# Patient Record
Sex: MALE | Race: BLACK OR AFRICAN AMERICAN | NOT HISPANIC OR LATINO | Employment: OTHER | URBAN - METROPOLITAN AREA
[De-identification: names, ages, dates, MRNs, and addresses within clinical notes are randomized per-mention and may not be internally consistent; named-entity substitution may affect disease eponyms.]

---

## 2021-10-13 DIAGNOSIS — U07.1 COVID-19 VIRUS DETECTED: ICD-10-CM

## 2021-10-15 ENCOUNTER — PATIENT OUTREACH (OUTPATIENT)
Dept: INFECTIOUS DISEASES | Facility: HOSPITAL | Age: 71
End: 2021-10-15

## 2021-10-15 ENCOUNTER — NURSE TRIAGE (OUTPATIENT)
Dept: ADMINISTRATIVE | Facility: CLINIC | Age: 71
End: 2021-10-15

## 2021-10-18 ENCOUNTER — TELEPHONE (OUTPATIENT)
Dept: ADMINISTRATIVE | Facility: OTHER | Age: 71
End: 2021-10-18

## 2022-05-09 ENCOUNTER — TELEPHONE (OUTPATIENT)
Dept: ADMINISTRATIVE | Facility: OTHER | Age: 72
End: 2022-05-09
Payer: MEDICARE

## 2022-05-11 ENCOUNTER — TELEPHONE (OUTPATIENT)
Dept: DERMATOLOGY | Facility: CLINIC | Age: 72
End: 2022-05-11
Payer: MEDICARE

## 2022-05-11 NOTE — TELEPHONE ENCOUNTER
Apt made and confirm    ----- Message from Yuki De Leon sent at 5/11/2022  1:48 PM CDT -----  Good afternoon,    The patient have an urgent referral from the emergency department with a diagnosis of Dermatitis. He want to know if he can get an appointment sometime next week, because he cannot come this week. Please assist with scheduling the patient?    Thank you

## 2022-08-10 ENCOUNTER — HOSPITAL ENCOUNTER (EMERGENCY)
Facility: HOSPITAL | Age: 72
Discharge: HOME | End: 2022-08-10
Attending: EMERGENCY MEDICINE
Payer: COMMERCIAL

## 2022-08-10 VITALS
HEIGHT: 69 IN | HEART RATE: 77 BPM | RESPIRATION RATE: 20 BRPM | SYSTOLIC BLOOD PRESSURE: 175 MMHG | BODY MASS INDEX: 31.1 KG/M2 | TEMPERATURE: 98 F | WEIGHT: 210 LBS | OXYGEN SATURATION: 98 % | DIASTOLIC BLOOD PRESSURE: 89 MMHG

## 2022-08-10 DIAGNOSIS — M25.461 BILATERAL KNEE SWELLING: ICD-10-CM

## 2022-08-10 DIAGNOSIS — R53.1 WEAKNESS: ICD-10-CM

## 2022-08-10 DIAGNOSIS — M25.462 BILATERAL KNEE SWELLING: ICD-10-CM

## 2022-08-10 DIAGNOSIS — M10.9 ACUTE GOUT OF RIGHT KNEE, UNSPECIFIED CAUSE: Primary | ICD-10-CM

## 2022-08-10 LAB
ALBUMIN SERPL BCP-MCNC: 3.7 G/DL (ref 3.5–5.2)
ALP SERPL-CCNC: 69 U/L (ref 55–135)
ALT SERPL W/O P-5'-P-CCNC: 14 U/L (ref 10–44)
ANION GAP SERPL CALC-SCNC: 10 MMOL/L (ref 8–16)
APPEARANCE FLD: NORMAL
AST SERPL-CCNC: 14 U/L (ref 10–40)
BACTERIA #/AREA URNS AUTO: ABNORMAL /HPF
BASOPHILS # BLD AUTO: 0.03 K/UL (ref 0–0.2)
BASOPHILS NFR BLD: 0.3 % (ref 0–1.9)
BILIRUB SERPL-MCNC: 2 MG/DL (ref 0.1–1)
BILIRUB UR QL STRIP: NEGATIVE
BODY FLD TYPE: ABNORMAL
BODY FLD TYPE: NORMAL
BUN SERPL-MCNC: 26 MG/DL (ref 8–23)
CALCIUM SERPL-MCNC: 11.2 MG/DL (ref 8.7–10.5)
CHLORIDE SERPL-SCNC: 100 MMOL/L (ref 95–110)
CLARITY UR REFRACT.AUTO: CLEAR
CO2 SERPL-SCNC: 26 MMOL/L (ref 23–29)
COLOR FLD: YELLOW
COLOR UR AUTO: YELLOW
CREAT SERPL-MCNC: 2 MG/DL (ref 0.5–1.4)
CRP SERPL-MCNC: 222.9 MG/L (ref 0–8.2)
CRYSTALS FLD MICRO: POSITIVE
DIFFERENTIAL METHOD: ABNORMAL
EOSINOPHIL # BLD AUTO: 0.2 K/UL (ref 0–0.5)
EOSINOPHIL NFR BLD: 1.9 % (ref 0–8)
ERYTHROCYTE [DISTWIDTH] IN BLOOD BY AUTOMATED COUNT: 13.8 % (ref 11.5–14.5)
ERYTHROCYTE [SEDIMENTATION RATE] IN BLOOD BY PHOTOMETRIC METHOD: 66 MM/HR (ref 0–23)
EST. GFR  (NO RACE VARIABLE): 34.8 ML/MIN/1.73 M^2
GLUCOSE SERPL-MCNC: 146 MG/DL (ref 70–110)
GLUCOSE UR QL STRIP: NEGATIVE
GRAM STN SPEC: NORMAL
GRAM STN SPEC: NORMAL
HCT VFR BLD AUTO: 43.2 % (ref 40–54)
HGB BLD-MCNC: 15.3 G/DL (ref 14–18)
HGB UR QL STRIP: NEGATIVE
HYALINE CASTS UR QL AUTO: 9 /LPF
IMM GRANULOCYTES # BLD AUTO: 0.03 K/UL (ref 0–0.04)
IMM GRANULOCYTES NFR BLD AUTO: 0.3 % (ref 0–0.5)
KETONES UR QL STRIP: NEGATIVE
LACTATE SERPL-SCNC: 2.1 MMOL/L (ref 0.5–2.2)
LACTATE SERPL-SCNC: 2.9 MMOL/L (ref 0.5–2.2)
LEUKOCYTE ESTERASE UR QL STRIP: NEGATIVE
LYMPHOCYTES # BLD AUTO: 1.1 K/UL (ref 1–4.8)
LYMPHOCYTES NFR BLD: 12.3 % (ref 18–48)
LYMPHOCYTES NFR FLD MANUAL: 4 %
MCH RBC QN AUTO: 34.1 PG (ref 27–31)
MCHC RBC AUTO-ENTMCNC: 35.4 G/DL (ref 32–36)
MCV RBC AUTO: 96 FL (ref 82–98)
MICROSCOPIC COMMENT: ABNORMAL
MONOCYTES # BLD AUTO: 0.9 K/UL (ref 0.3–1)
MONOCYTES NFR BLD: 10 % (ref 4–15)
MONOS+MACROS NFR FLD MANUAL: 2 %
NEUTROPHILS # BLD AUTO: 6.8 K/UL (ref 1.8–7.7)
NEUTROPHILS NFR BLD: 75.2 % (ref 38–73)
NEUTROPHILS NFR FLD MANUAL: 94 %
NITRITE UR QL STRIP: NEGATIVE
NRBC BLD-RTO: 0 /100 WBC
PH UR STRIP: 6 [PH] (ref 5–8)
PLATELET # BLD AUTO: 193 K/UL (ref 150–450)
PMV BLD AUTO: 11 FL (ref 9.2–12.9)
POTASSIUM SERPL-SCNC: 4.9 MMOL/L (ref 3.5–5.1)
PROT SERPL-MCNC: 8.6 G/DL (ref 6–8.4)
PROT UR QL STRIP: ABNORMAL
RBC # BLD AUTO: 4.49 M/UL (ref 4.6–6.2)
RBC #/AREA URNS AUTO: 1 /HPF (ref 0–4)
SODIUM SERPL-SCNC: 136 MMOL/L (ref 136–145)
SP GR UR STRIP: 1.01 (ref 1–1.03)
SQUAMOUS #/AREA URNS AUTO: 0 /HPF
URN SPEC COLLECT METH UR: ABNORMAL
WBC # BLD AUTO: 9.01 K/UL (ref 3.9–12.7)
WBC # FLD: NORMAL /CU MM
WBC #/AREA URNS AUTO: 1 /HPF (ref 0–5)

## 2022-08-10 PROCEDURE — 86803 HEPATITIS C AB TEST: CPT | Performed by: PHYSICIAN ASSISTANT

## 2022-08-10 PROCEDURE — 86140 C-REACTIVE PROTEIN: CPT | Performed by: PHYSICIAN ASSISTANT

## 2022-08-10 PROCEDURE — 20610 PR DRAIN/INJECT LARGE JOINT/BURSA: ICD-10-PCS | Mod: RT,,, | Performed by: EMERGENCY MEDICINE

## 2022-08-10 PROCEDURE — 80053 COMPREHEN METABOLIC PANEL: CPT | Performed by: PHYSICIAN ASSISTANT

## 2022-08-10 PROCEDURE — 85652 RBC SED RATE AUTOMATED: CPT | Performed by: PHYSICIAN ASSISTANT

## 2022-08-10 PROCEDURE — 99285 EMERGENCY DEPT VISIT HI MDM: CPT | Mod: 25

## 2022-08-10 PROCEDURE — 93010 EKG 12-LEAD: ICD-10-PCS | Mod: ,,, | Performed by: INTERNAL MEDICINE

## 2022-08-10 PROCEDURE — 81001 URINALYSIS AUTO W/SCOPE: CPT | Performed by: PHYSICIAN ASSISTANT

## 2022-08-10 PROCEDURE — 20610 DRAIN/INJ JOINT/BURSA W/O US: CPT | Mod: RT

## 2022-08-10 PROCEDURE — 87116 MYCOBACTERIA CULTURE: CPT | Performed by: EMERGENCY MEDICINE

## 2022-08-10 PROCEDURE — 96374 THER/PROPH/DIAG INJ IV PUSH: CPT

## 2022-08-10 PROCEDURE — 87206 SMEAR FLUORESCENT/ACID STAI: CPT | Performed by: EMERGENCY MEDICINE

## 2022-08-10 PROCEDURE — 93010 ELECTROCARDIOGRAM REPORT: CPT | Mod: ,,, | Performed by: INTERNAL MEDICINE

## 2022-08-10 PROCEDURE — 20610 DRAIN/INJ JOINT/BURSA W/O US: CPT | Mod: RT,,, | Performed by: EMERGENCY MEDICINE

## 2022-08-10 PROCEDURE — 87040 BLOOD CULTURE FOR BACTERIA: CPT | Performed by: PHYSICIAN ASSISTANT

## 2022-08-10 PROCEDURE — 85025 COMPLETE CBC W/AUTO DIFF WBC: CPT | Performed by: PHYSICIAN ASSISTANT

## 2022-08-10 PROCEDURE — 99284 EMERGENCY DEPT VISIT MOD MDM: CPT | Mod: 25,FS,, | Performed by: EMERGENCY MEDICINE

## 2022-08-10 PROCEDURE — 89051 BODY FLUID CELL COUNT: CPT | Performed by: EMERGENCY MEDICINE

## 2022-08-10 PROCEDURE — 99284 PR EMERGENCY DEPT VISIT,LEVEL IV: ICD-10-PCS | Mod: 25,FS,, | Performed by: EMERGENCY MEDICINE

## 2022-08-10 PROCEDURE — 83605 ASSAY OF LACTIC ACID: CPT | Performed by: PHYSICIAN ASSISTANT

## 2022-08-10 PROCEDURE — 87205 SMEAR GRAM STAIN: CPT | Performed by: EMERGENCY MEDICINE

## 2022-08-10 PROCEDURE — 96361 HYDRATE IV INFUSION ADD-ON: CPT | Mod: 59

## 2022-08-10 PROCEDURE — 89060 EXAM SYNOVIAL FLUID CRYSTALS: CPT | Performed by: EMERGENCY MEDICINE

## 2022-08-10 PROCEDURE — 25000003 PHARM REV CODE 250: Performed by: PHYSICIAN ASSISTANT

## 2022-08-10 PROCEDURE — 93005 ELECTROCARDIOGRAM TRACING: CPT

## 2022-08-10 PROCEDURE — 87070 CULTURE OTHR SPECIMN AEROBIC: CPT | Performed by: EMERGENCY MEDICINE

## 2022-08-10 PROCEDURE — 63600175 PHARM REV CODE 636 W HCPCS: Performed by: PHYSICIAN ASSISTANT

## 2022-08-10 RX ORDER — KETOROLAC TROMETHAMINE 30 MG/ML
10 INJECTION, SOLUTION INTRAMUSCULAR; INTRAVENOUS
Status: COMPLETED | OUTPATIENT
Start: 2022-08-10 | End: 2022-08-10

## 2022-08-10 RX ORDER — COLCHICINE 0.6 MG/1
0.6 TABLET ORAL DAILY
Qty: 5 TABLET | Refills: 0 | Status: SHIPPED | OUTPATIENT
Start: 2022-08-10

## 2022-08-10 RX ORDER — COLCHICINE 0.6 MG/1
1.2 TABLET, FILM COATED ORAL
Status: COMPLETED | OUTPATIENT
Start: 2022-08-10 | End: 2022-08-10

## 2022-08-10 RX ORDER — DICLOFENAC SODIUM 10 MG/G
2 GEL TOPICAL DAILY
Qty: 50 G | Refills: 0 | Status: SHIPPED | OUTPATIENT
Start: 2022-08-10

## 2022-08-10 RX ADMIN — SODIUM CHLORIDE, SODIUM LACTATE, POTASSIUM CHLORIDE, AND CALCIUM CHLORIDE 1000 ML: .6; .31; .03; .02 INJECTION, SOLUTION INTRAVENOUS at 06:08

## 2022-08-10 RX ADMIN — KETOROLAC TROMETHAMINE 10 MG: 30 INJECTION, SOLUTION INTRAMUSCULAR; INTRAVENOUS at 09:08

## 2022-08-10 RX ADMIN — COLCHICINE 1.2 MG: 0.6 TABLET, FILM COATED ORAL at 09:08

## 2022-08-10 RX ADMIN — SODIUM CHLORIDE, SODIUM LACTATE, POTASSIUM CHLORIDE, AND CALCIUM CHLORIDE 1000 ML: .6; .31; .03; .02 INJECTION, SOLUTION INTRAVENOUS at 03:08

## 2022-08-10 NOTE — ED PROVIDER NOTES
Encounter Date: 8/10/2022       History     Chief Complaint   Patient presents with    Multiple complaints     Both knees swollen, feet and hands cracking , unable to stand     71-year-old male with hypertension presents to the ED for evaluation of knee pain and swelling as well as weakness.  Patient noticed last night that he was not able to stand up due to weakness.  He also noticed that both of his knees were swollen.  He has pain in both of his knees.  Patient does not feel weak in his upper body.  Patient is febrile in the ED, but was unaware that he had a fever.  Pt also complaining of a rash to his hand and feet for the past 3 weeks. He has been following up with his primary doctor for this. He notes that his hands are starting to crack in certain places. Patient denies cough, nausea, vomiting, diarrhea, shortness of breath, chest pain, changes in urination or other acute complaints.        Review of patient's allergies indicates:  No Known Allergies  Past Medical History:   Diagnosis Date    Hypertension      Past Surgical History:   Procedure Laterality Date    COLONOSCOPY N/A 10/27/2016    Procedure: COLONOSCOPY;  Surgeon: CHAPO Field MD;  Location: [facility];  Service: Endoscopy;  Laterality: N/A;    MANDIBLE SURGERY       Family History   Problem Relation Age of Onset    Hypertension Mother      Social History     Tobacco Use    Smoking status: Heavy Tobacco Smoker     Packs/day: 0.50    Smokeless tobacco: Never Used   Substance Use Topics    Alcohol use: No    Drug use: No     Review of Systems   Constitutional: Negative for fever.   HENT: Negative for sore throat.    Respiratory: Negative for shortness of breath.    Cardiovascular: Negative for chest pain.   Gastrointestinal: Negative for nausea.   Genitourinary: Negative for dysuria.   Musculoskeletal: Positive for arthralgias and joint swelling. Negative for back pain.   Skin: Positive for rash.   Neurological: Negative for weakness.    Hematological: Does not bruise/bleed easily.       Physical Exam     Initial Vitals [08/10/22 1400]   BP Pulse Resp Temp SpO2   131/78 85 20 (!) 100.8 °F (38.2 °C) 97 %      MAP       --         Physical Exam    Nursing note and vitals reviewed.  Constitutional: He appears well-developed and well-nourished.  Non-toxic appearance. He does not appear ill. No distress.   HENT:   Head: Normocephalic and atraumatic.   Neck: Neck supple.   Normal range of motion.  Cardiovascular: Normal rate and regular rhythm. Exam reveals no gallop, no distant heart sounds and no friction rub.    No murmur heard.  Pulmonary/Chest: Effort normal and breath sounds normal. No accessory muscle usage. No tachypnea. No respiratory distress. He has no decreased breath sounds. He has no wheezes. He has no rhonchi. He has no rales.   Abdominal: He exhibits no distension.   Musculoskeletal:      Cervical back: Normal range of motion and neck supple.      Comments: Large effusion noted to the right knee with tenderness slight overlying erythema and painful range of motion.  There is also a moderate effusion with tenderness and slight overlying erythema to the left knee.  Also with painful range of motion.     Neurological: He is alert.   Skin: No rash noted.         ED Course   Procedures  Labs Reviewed   CBC W/ AUTO DIFFERENTIAL - Abnormal; Notable for the following components:       Result Value    RBC 4.49 (*)     MCH 34.1 (*)     Gran % 75.2 (*)     Lymph % 12.3 (*)     All other components within normal limits   COMPREHENSIVE METABOLIC PANEL - Abnormal; Notable for the following components:    Glucose 146 (*)     BUN 26 (*)     Creatinine 2.0 (*)     Calcium 11.2 (*)     Total Protein 8.6 (*)     Total Bilirubin 2.0 (*)     eGFR 34.8 (*)     All other components within normal limits   LACTIC ACID, PLASMA - Abnormal; Notable for the following components:    Lactate (Lactic Acid) 2.9 (*)     All other components within normal limits    URINALYSIS, REFLEX TO URINE CULTURE - Abnormal; Notable for the following components:    Protein, UA 1+ (*)     All other components within normal limits    Narrative:     Specimen Source->Urine   SEDIMENTATION RATE - Abnormal; Notable for the following components:    Sed Rate 66 (*)     All other components within normal limits   C-REACTIVE PROTEIN - Abnormal; Notable for the following components:    .9 (*)     All other components within normal limits   BODY FLUID CRYSTAL - Abnormal; Notable for the following components:    Body Fluid Crystal Positive (*)     All other components within normal limits    Narrative:     Joint Fluid   URINALYSIS MICROSCOPIC - Abnormal; Notable for the following components:    Hyaline Casts, UA 9 (*)     All other components within normal limits    Narrative:     Specimen Source->Urine   GRAM STAIN    Narrative:     Joint Fluid   CULTURE, BLOOD   CULTURE, BLOOD   AFB CULTURE & SMEAR   CULTURE, BODY FLUID - BACTEC   WBC & DIFF, BODY FLUID    Narrative:     Joint Fluid   LACTIC ACID, PLASMA   HEPATITIS C ANTIBODY          Imaging Results          X-Ray Knee 1 or 2 View Bilateral (Final result)  Result time 08/10/22 16:48:19    Final result by Micah Lawrence MD (08/10/22 16:48:19)                 Impression:      Suspected bilateral nonspecific suprapatellar joint effusions larger on the right without acute displaced fracture-dislocation identified.    Bilateral DJD, progressed from priors.      Electronically signed by: Miach Lawrence MD  Date:    08/10/2022  Time:    16:48             Narrative:    EXAMINATION:  XR KNEE 1 OR 2 VIEW BILATERAL    CLINICAL HISTORY:  Effusion, right knee    TECHNIQUE:  AP and lateral views of each knee    COMPARISON:  Left knee series 01/12/2017, bilateral knee standing radiograph 04/16/2012    FINDINGS:  Right knee: There is suspected nonspecific large suprapatellar joint effusion.  No displaced fracture, dislocation or destructive osseous  process.  Mild to moderate tricompartmental degenerative change greatest at the medial and patellofemoral compartments, progressed from 2012 study.  Suspected chondrocalcinosis at the medial and lateral compartments.  Stable small linear metallic density at the lateral aspect of the knee.  Scattered atherosclerotic vascular calcifications noted.  No subcutaneous emphysema.    Left knee: There is suspected nonspecific small suprapatellar joint effusion.  No displaced fracture, dislocation or destructive osseous process.  Mild-to-moderate tricompartmental degenerative change greatest at the medial and patellofemoral compartments progressed from 2012 and 2017 studies.  Suspected chondrocalcinosis at the medial and lateral compartments.  Scattered atherosclerotic vascular calcifications noted.  No subcutaneous emphysema or radiodense retained foreign body.                               X-Ray Chest AP Portable (Final result)  Result time 08/10/22 16:36:41    Final result by Micah Lawrence MD (08/10/22 16:36:41)                 Impression:      No detrimental change or radiographic acute intrathoracic process seen on this single view.      Electronically signed by: Micah Lawrence MD  Date:    08/10/2022  Time:    16:36             Narrative:    EXAMINATION:  XR CHEST AP PORTABLE    CLINICAL HISTORY:  weakness;    TECHNIQUE:  Single frontal view of the chest was performed.    COMPARISON:  Chest radiograph 10/19/2016    FINDINGS:  Patient is slightly rotated.    Cardiomediastinal silhouette is midline and prominent with calcification and slight tortuosity of the aorta similar to prior.  Pulmonary vasculature and hilar contours are within normal limits.  Similar slight nonspecific elevation the right hemidiaphragm.    The lungs are otherwise well expanded without consolidation, pleural effusion or pneumothorax definitively seen.  No acute osseous process seen.  PA and lateral views can be obtained.                                  Medications   lactated ringers bolus 1,000 mL (0 mLs Intravenous Stopped 8/10/22 1822)   lactated ringers bolus 1,000 mL (0 mLs Intravenous Stopped 8/10/22 1952)   colchicine capsule/tablet 1.2 mg (1.2 mg Oral Given 8/10/22 2123)   ketorolac injection 9.999 mg (9.999 mg Intravenous Given 8/10/22 2123)     Medical Decision Making:   History:   Old Medical Records: I decided to obtain old medical records.  Initial Assessment:   71-year-old male presents to the ED with knee pain and swelling that he noticed yesterday.  He is febrile at 100.8 in the ED.  hypertensive.  Vitals otherwise within normal limits.  Differential Diagnosis:   My differential diagnosis includes but is not limited to:  Septic arthritis, gout, sepsis, effusion  Clinical Tests:   Lab Tests: Ordered  Radiological Study: Ordered  ED Management:  No leukocytosis.  CRP is elevated to 222.9.  ESR is elevated at 66.  Lactate is elevated at 2.9.  Patient has a mild DOM with a creatinine of 2.0 increased from baseline of 1.0.  Knee x-rays with bilateral joint effusions without evidence of fracture or other concerning abnormality.  Arthrocentesis performed by my attending.  Fluid analysis is consistent with gout.  Patient received 2 L of IV fluids in the ED.  4 hour lactate improved to 2.1.  Patient defervesced without antipyretic medication.  Feel that he can be treated for gout as an outpatient.  Will treat with colchicine and topical Voltaren.  I will place referral to Rheumatology.  Patient advised follow-up with Rheumatology or his primary doctor in the next few days if symptoms are not improving with treatment.  ED return precautions given.  I have reviewed the patient's records and discussed this case with my supervising physician.                ED Course as of 08/10/22 2258   Wed Aug 10, 2022   1632 Creatinine(!): 2.0 [EH]   1633 BUN(!): 26 [EH]   1633 Temp(!): 100.8 °F (38.2 °C) [EH]   1914 Lactate, Jorje(!): 2.9 [EH]   1914 BILIRUBIN  TOTAL(!): 2.0 [EH]      ED Course User Index  [EH] Noreen Dubose PA-C             Clinical Impression:   Final diagnoses:  [R53.1] Weakness  [M25.461, M25.462] Bilateral knee swelling  [M10.9] Acute gout of right knee, unspecified cause (Primary)          ED Disposition Condition    Discharge Stable        ED Prescriptions     Medication Sig Dispense Start Date End Date Auth. Provider    colchicine (COLCRYS) 0.6 mg tablet Take 1 tablet (0.6 mg total) by mouth once daily. 5 tablet 8/10/2022  Noreen Dubose PA-C    diclofenac sodium (VOLTAREN) 1 % Gel Apply 2 g topically once daily. 50 g 8/10/2022  Noreen Dubose PA-C        Follow-up Information     Follow up With Specialties Details Why Contact Info Additional Information    [id number] In 1 week For reevaluation, If symptoms do not improve [address] [facility] [address] [phone] [facility] - Main Building, 5th Floor Please park in [address] and use [address] elevator           Noreen Dubose PA-C  08/10/22 9103

## 2022-08-10 NOTE — PROCEDURES - EMERGENCY DEPT.
Arthrocentesis    Date/Time: 8/10/2022 5:11 PM  Location procedure was performed: [facility] EMERGENCY DEPARTMENT  Performed by: Luis Miguel Forrest MD  Authorized by: Luis Miguel Forrest MD   Pre-op diagnosis: right knee effusion  Post-op diagnosis: right knee effusion  Consent Done: Yes  Consent: Verbal consent obtained. Written consent not obtained.  Risks and benefits: risks, benefits and alternatives were discussed  Consent given by: patient  Patient understanding: patient states understanding of the procedure being performed  Patient consent: the patient's understanding of the procedure matches consent given  Patient identity confirmed: name  Indications: possible septic joint   Body area: knee  Joint: right knee  Local anesthesia used: no    Anesthesia:  Local anesthesia used: no    Patient sedated: no  Needle size: 18 G  Approach: superior  Aspirate amount: 70 mL  Aspirate: cloudy  Patient tolerance: Patient tolerated the procedure well with no immediate complications  Complications: No  Estimated blood loss (mL): 0  Specimens: Yes  Implants: No  Comments: Fluid sent for analysis

## 2022-08-11 ENCOUNTER — TELEPHONE (OUTPATIENT)
Dept: EMERGENCY MEDICINE | Facility: HOSPITAL | Age: 72
End: 2022-08-11
Payer: COMMERCIAL

## 2022-08-11 LAB
HCV AB SERPL QL IA: NEGATIVE
PATH INTERP FLD-IMP: NORMAL

## 2022-08-11 NOTE — ED NOTES
Oscar Osorio Sr., a 71 y.o. male presents to the ED w/ complaint of bilat knee swelling and unable to walk today and Cracked dry skin. nadn resp even and unlabored, will monitor.    Triage note:  Chief Complaint   Patient presents with    Multiple complaints     Both knees swollen, feet and hands cracking , unable to stand     Review of patient's allergies indicates:  No Known Allergies  Past Medical History:   Diagnosis Date    Hypertension

## 2022-08-11 NOTE — DISCHARGE INSTRUCTIONS
You have gout.  Use your prescription pain gel as directed.  You can also take Tylenol as needed for pain.  Use as directed on the bottle and do not take more than what is recommended any 24 hour period.    Return to the ER for re-evaluation if your symptoms are worsening or if you develop fever greater than 100.4°, weakness, numbness in the legs or any other worrisome symptoms.

## 2022-08-13 LAB
BACTERIA BLD CULT: ABNORMAL

## 2022-08-15 LAB
BACTERIA BLD CULT: NORMAL
BACTERIA FLD CULT: NORMAL

## 2022-08-23 ENCOUNTER — TELEPHONE (OUTPATIENT)
Dept: RHEUMATOLOGY | Facility: CLINIC | Age: 72
End: 2022-08-23
Payer: COMMERCIAL

## 2022-08-24 ENCOUNTER — OFFICE VISIT (OUTPATIENT)
Dept: RHEUMATOLOGY | Facility: CLINIC | Age: 72
End: 2022-08-24
Payer: COMMERCIAL

## 2022-08-24 ENCOUNTER — HOSPITAL ENCOUNTER (OUTPATIENT)
Dept: RADIOLOGY | Facility: HOSPITAL | Age: 72
Discharge: HOME | End: 2022-08-24
Attending: INTERNAL MEDICINE
Payer: COMMERCIAL

## 2022-08-24 DIAGNOSIS — E83.52 HYPERCALCEMIA: ICD-10-CM

## 2022-08-24 DIAGNOSIS — M11.20 CHONDROCALCINOSIS: ICD-10-CM

## 2022-08-24 DIAGNOSIS — Z87.39 HISTORY OF GOUT: Primary | ICD-10-CM

## 2022-08-24 DIAGNOSIS — I10 HYPERTENSION, UNSPECIFIED TYPE: ICD-10-CM

## 2022-08-24 DIAGNOSIS — R21 RASH: ICD-10-CM

## 2022-08-24 DIAGNOSIS — M10.9 GOUT, UNSPECIFIED CAUSE, UNSPECIFIED CHRONICITY, UNSPECIFIED SITE: ICD-10-CM

## 2022-08-24 DIAGNOSIS — R73.09 ELEVATED RANDOM BLOOD GLUCOSE LEVEL: ICD-10-CM

## 2022-08-24 DIAGNOSIS — Z00.00 HEALTHCARE MAINTENANCE: ICD-10-CM

## 2022-08-24 DIAGNOSIS — D64.9 ANEMIA, UNSPECIFIED TYPE: ICD-10-CM

## 2022-08-24 LAB
BODY FLD TYPE: ABNORMAL
CRYSTALS FLD MICRO: POSITIVE
GRAM STN SPEC: NORMAL
GRAM STN SPEC: NORMAL

## 2022-08-24 PROCEDURE — 87205 SMEAR GRAM STAIN: CPT | Performed by: INTERNAL MEDICINE

## 2022-08-24 PROCEDURE — 99999 PR PBB SHADOW E&M-EST. PATIENT-LVL III: ICD-10-PCS | Mod: PBBFAC,GC,, | Performed by: INTERNAL MEDICINE

## 2022-08-24 PROCEDURE — 87070 CULTURE OTHR SPECIMN AEROBIC: CPT | Performed by: INTERNAL MEDICINE

## 2022-08-24 PROCEDURE — 89060 EXAM SYNOVIAL FLUID CRYSTALS: CPT | Performed by: INTERNAL MEDICINE

## 2022-08-24 PROCEDURE — 99213 OFFICE O/P EST LOW 20 MIN: CPT | Mod: PBBFAC,25 | Performed by: INTERNAL MEDICINE

## 2022-08-24 PROCEDURE — 71046 X-RAY EXAM CHEST 2 VIEWS: CPT | Mod: TC,FY

## 2022-08-24 PROCEDURE — 99204 PR OFFICE/OUTPT VISIT, NEW, LEVL IV, 45-59 MIN: ICD-10-PCS | Mod: GC,S$GLB,, | Performed by: INTERNAL MEDICINE

## 2022-08-24 PROCEDURE — 71046 XR CHEST PA AND LATERAL: ICD-10-PCS | Mod: 26,,, | Performed by: RADIOLOGY

## 2022-08-24 PROCEDURE — 99999 PR PBB SHADOW E&M-EST. PATIENT-LVL III: CPT | Mod: PBBFAC,GC,, | Performed by: INTERNAL MEDICINE

## 2022-08-24 PROCEDURE — 71046 X-RAY EXAM CHEST 2 VIEWS: CPT | Mod: 26,,, | Performed by: RADIOLOGY

## 2022-08-24 PROCEDURE — 99204 OFFICE O/P NEW MOD 45 MIN: CPT | Mod: GC,S$GLB,, | Performed by: INTERNAL MEDICINE

## 2022-08-25 ENCOUNTER — TELEPHONE (OUTPATIENT)
Dept: ADMINISTRATIVE | Facility: OTHER | Age: 72
End: 2022-08-25
Payer: COMMERCIAL

## 2022-08-25 ENCOUNTER — TELEPHONE (OUTPATIENT)
Dept: RHEUMATOLOGY | Facility: CLINIC | Age: 72
End: 2022-08-25
Payer: COMMERCIAL

## 2022-08-25 LAB — PATH INTERP FLD-IMP: NORMAL

## 2022-08-25 NOTE — PROGRESS NOTES
I have personally reviewed the history, confirmed exam findings, and discussed assessment and plan with fellow. The right knee arthrocentesis performed by Dr. Jacques personally observed and assisted.  Gout proven by MSU crystals right knee fluid. Staph epi from blood culture likely contaminant, but we re-aspirated right knee to recheck bacterial culture and also ordered blood cultures x2. Needs evaluation for chondrocalcinosis, hypercalcemia, renal insufficiency, h/I T2 DM as well as will establish with [facility]. RADHA

## 2022-08-25 NOTE — TELEPHONE ENCOUNTER
Grzegorz, what about the repeat blood cultures x2  B/o the coag neg Staph on blood culture from 8/10.  RADHA

## 2022-08-25 NOTE — PROGRESS NOTES
Subjective:       Patient ID: Oscar Osorio Sr. is a 71 y.o. male.    Chief Complaint: No chief complaint on file.    71 year old male with PMH HTN presents to discuss recently diagnosed gout. Patient was seen in the ED on 8/10 with bilateral knee pain R > L, swelling, erythema with sudden onset. Pain made ambulation very difficult. Arthrocentesis was performed which demonstrated 31k WBC, Urate crystals. . ESR 66. Cultures of aspirated fluid and blood not suggestive of infection, though 1/2 BCX did grow coag negative staff. Patient was provided with Colchicine 0.6 mg x 5. He initially reports that he did not take this, though later says he did. He also was given allopurinol, which he states he didn't take. At this point, patient states his knees are not hurting and haven't been in several days. He has never had a gout attack, podagra, etc. No history of kidney stones. No family history of gout.     Patient also inquires about an all over body rash. He was seen by a cardiologist, who provided him with triamcinolone cream and a Dermatology referral in early September. Triamcinolone cream has not provided significant relief.     Review of Systems   Constitutional: Negative for activity change, appetite change, chills, diaphoresis, fatigue and fever.   HENT: Negative for congestion, dental problem, drooling and ear pain.    Respiratory: Negative for apnea, cough, choking, chest tightness, shortness of breath and stridor.    Cardiovascular: Negative for chest pain, palpitations and leg swelling.   Gastrointestinal: Negative for abdominal distention, abdominal pain, anal bleeding, blood in stool, constipation, diarrhea, nausea, rectal pain and vomiting.   Musculoskeletal: Negative for arthralgias, back pain, gait problem, joint swelling and myalgias.   Skin: Positive for rash. Negative for color change, pallor and wound.   Neurological: Negative for dizziness, facial asymmetry, light-headedness and headaches.          Objective:   There were no vitals taken for this visit.     Physical Exam   Constitutional: He is oriented to person, place, and time. He does not appear ill. No distress.   HENT:   Head: Normocephalic and atraumatic.   Cardiovascular: Normal rate, regular rhythm, normal heart sounds and normal pulses.   Pulmonary/Chest: Effort normal and breath sounds normal. No stridor. No respiratory distress.   Abdominal: Soft. Normal appearance and bowel sounds are normal. He exhibits no distension and no mass. There is no abdominal tenderness. No hernia.   Neurological: He is alert and oriented to person, place, and time. He displays no weakness. No cranial nerve deficit or sensory deficit. Coordination normal.   Skin: Skin is warm and dry. Rash (Body rash noted. Particularly obvious on the palmar hands, though present on anterior chest, abdomen, back, feet, legs) noted. No jaundice or pallor.   Psychiatric: Mood normal.   Vitals reviewed.       No data to display     Assessment:       1. History of gout    2. Hypercalcemia    3. Rash    4. Chondrocalcinosis    5. Anemia, unspecified type    6. Healthcare maintenance    7. Elevated random blood glucose level        Gout: by history and with aspiration performed in the ED consistent with this diagnosis. No personal or family history. No history of kidney stones. Patient improved while on colchicine, currently without pain. No podagra. Patient not taking allopurinol and advised not to given high risk of adverse reaction until we can screen for HLA . Would likely start him on a lower dose of allopurinol than 100mg as he likely has CKD. 1/2 BCX positive likely contaminant given speciation and resolution of joint pain at this time. Patient knee aspirated in the office and will send for repeat studies and cultures.   -BCX repeat and follow up results  -Aspiration of Rt knee and will repeat studies  -HLA   -Check UA    Chondrocalcinosis: seen on Knee XR. Will screen for  related diseases.   -Rule out hypothyroidism, hyperparathyroidism, hypo Mg/Phos, hemachromatosis.     Rash: Unclear etiology. Do not suspect rheumatologic. Proximal strength intact. Agree with Derm referral   -Have asked patient to fax us results of dermatology evaluation.   -Check CK/Aldolase to rule out any myositis though very low suspicion    Healthcare maintenance: several lab abnormalities noted on recent ED visit including Hypercalcemia, worsening renal function, elevated BG. Patient reports he has a PCP, but struggles to name this doctor and is unsure how frequently he has been seen by him. Ultimately names a Cardiologist when he does produce a name. I do not see regular lab work in our system or in care everywhere to suggest regular follow up. I suspect patient would benefit from establishing with a PCP, will refer.   -Repeat renal function testing and check A1c  -referral to IM placed  -Hypercalcemia workup including PTH, PTHRP, Vit D panel, Renal function panel, etc.       Plan:       Problem List Items Addressed This Visit    None     Visit Diagnoses     History of gout    -  Primary    Relevant Orders    RENAL FUNCTION PANEL (Completed)    COMPREHENSIVE METABOLIC PANEL (Completed)    CBC W/ AUTO DIFFERENTIAL (Completed)    URIC ACID (Completed)    CULTURE, BLOOD    CULTURE, BLOOD    WBC & Diff,Body Fluid Joint Fluid, Right Knee    Body fluid crystal Synovial Fluid (Completed)    Culture, aerobic    Gram Stain (Completed)    Ambulatory referral/consult to Internal Medicine    Misc Sendout Test, Blood HLA B 5801 (Completed)    Hypercalcemia        Relevant Orders    Vitamin D (Completed)    X-Ray Chest PA And Lateral (Completed)    RENAL FUNCTION PANEL (Completed)    COMPREHENSIVE METABOLIC PANEL (Completed)    CBC W/ AUTO DIFFERENTIAL (Completed)    PROTEIN ELECTROPHORESIS, SERUM (Completed)    IMMUNOFIXATION ELECTROPHORESIS, SERUM    PTH, intact (Completed)    PTH-RELATED PEPTIDE    Ambulatory  referral/consult to Internal Medicine    Rash        Relevant Orders    Myositis Specific 11 AB Panel    Aldolase    CK (Completed)    Chondrocalcinosis        Relevant Orders    Iron and TIBC (Completed)    Magnesium (Completed)    Anemia, unspecified type        Relevant Orders    Iron and TIBC (Completed)    TSH (Completed)    Healthcare maintenance        Relevant Orders    Iron and TIBC (Completed)    Elevated random blood glucose level        Relevant Orders    Hemoglobin A1C (Completed)        Darell Jacques PGY 4

## 2022-08-26 DIAGNOSIS — M10.9 GOUT, UNSPECIFIED CAUSE, UNSPECIFIED CHRONICITY, UNSPECIFIED SITE: Primary | ICD-10-CM

## 2022-08-27 LAB — BACTERIA SPEC AEROBE CULT: NO GROWTH

## 2022-08-29 DIAGNOSIS — E21.3 HYPERPARATHYROIDISM: Primary | ICD-10-CM

## 2022-09-15 DIAGNOSIS — M10.9 GOUT, UNSPECIFIED CAUSE, UNSPECIFIED CHRONICITY, UNSPECIFIED SITE: Primary | ICD-10-CM

## 2022-09-28 LAB
ACID FAST MOD KINY STN SPEC: NORMAL
MYCOBACTERIUM SPEC QL CULT: NORMAL

## 2024-03-18 ENCOUNTER — HOSPITAL ENCOUNTER (EMERGENCY)
Facility: HOSPITAL | Age: 74
Discharge: HOME | End: 2024-03-18
Attending: EMERGENCY MEDICINE

## 2024-03-18 VITALS
BODY MASS INDEX: 28.06 KG/M2 | SYSTOLIC BLOOD PRESSURE: 154 MMHG | HEART RATE: 74 BPM | DIASTOLIC BLOOD PRESSURE: 84 MMHG | WEIGHT: 190 LBS | OXYGEN SATURATION: 98 % | RESPIRATION RATE: 16 BRPM | TEMPERATURE: 98 F

## 2024-03-18 DIAGNOSIS — M10.9 GOUT, UNSPECIFIED CAUSE, UNSPECIFIED CHRONICITY, UNSPECIFIED SITE: ICD-10-CM

## 2024-03-18 DIAGNOSIS — M25.462 BILATERAL KNEE EFFUSIONS: Primary | ICD-10-CM

## 2024-03-18 DIAGNOSIS — M25.562 LEFT KNEE PAIN: ICD-10-CM

## 2024-03-18 DIAGNOSIS — M25.461 BILATERAL KNEE EFFUSIONS: Primary | ICD-10-CM

## 2024-03-18 LAB
ALBUMIN SERPL BCP-MCNC: 3.6 G/DL (ref 3.5–5.2)
ALP SERPL-CCNC: 60 U/L (ref 55–135)
ALT SERPL W/O P-5'-P-CCNC: 14 U/L (ref 10–44)
ANION GAP SERPL CALC-SCNC: 7 MMOL/L (ref 8–16)
APPEARANCE FLD: NORMAL
AST SERPL-CCNC: 15 U/L (ref 10–40)
BASOPHILS # BLD AUTO: 0.03 K/UL (ref 0–0.2)
BASOPHILS NFR BLD: 0.5 % (ref 0–1.9)
BILIRUB SERPL-MCNC: 1.6 MG/DL (ref 0.1–1)
BODY FLD TYPE: ABNORMAL
BODY FLD TYPE: NORMAL
BUN SERPL-MCNC: 29 MG/DL (ref 8–23)
CALCIUM SERPL-MCNC: 10.6 MG/DL (ref 8.7–10.5)
CHLORIDE SERPL-SCNC: 103 MMOL/L (ref 95–110)
CO2 SERPL-SCNC: 24 MMOL/L (ref 23–29)
COLOR FLD: YELLOW
CREAT SERPL-MCNC: 1.8 MG/DL (ref 0.5–1.4)
CRP SERPL-MCNC: 188.4 MG/L (ref 0–8.2)
CRYSTALS FLD MICRO: POSITIVE
DIFFERENTIAL METHOD BLD: ABNORMAL
EOSINOPHIL # BLD AUTO: 0.1 K/UL (ref 0–0.5)
EOSINOPHIL NFR BLD: 1.4 % (ref 0–8)
ERYTHROCYTE [DISTWIDTH] IN BLOOD BY AUTOMATED COUNT: 12.8 % (ref 11.5–14.5)
ERYTHROCYTE [SEDIMENTATION RATE] IN BLOOD BY PHOTOMETRIC METHOD: 28 MM/HR (ref 0–23)
EST. GFR  (NO RACE VARIABLE): 39 ML/MIN/1.73 M^2
GLUCOSE SERPL-MCNC: 108 MG/DL (ref 70–110)
GRAM STN SPEC: NORMAL
GRAM STN SPEC: NORMAL
HCT VFR BLD AUTO: 39.2 % (ref 40–54)
HCV AB SERPL QL IA: NORMAL
HGB BLD-MCNC: 13 G/DL (ref 14–18)
HIV 1+2 AB+HIV1 P24 AG SERPL QL IA: NORMAL
IMM GRANULOCYTES # BLD AUTO: 0.03 K/UL (ref 0–0.04)
IMM GRANULOCYTES NFR BLD AUTO: 0.5 % (ref 0–0.5)
LYMPHOCYTES # BLD AUTO: 1 K/UL (ref 1–4.8)
LYMPHOCYTES NFR BLD: 14.5 % (ref 18–48)
LYMPHOCYTES NFR FLD MANUAL: 1 %
MCH RBC QN AUTO: 29.7 PG (ref 27–31)
MCHC RBC AUTO-ENTMCNC: 33.2 G/DL (ref 32–36)
MCV RBC AUTO: 90 FL (ref 82–98)
MONOCYTES # BLD AUTO: 0.8 K/UL (ref 0.3–1)
MONOCYTES NFR BLD: 12.7 % (ref 4–15)
MONOS+MACROS NFR FLD MANUAL: 4 %
NEUTROPHILS # BLD AUTO: 4.7 K/UL (ref 1.8–7.7)
NEUTROPHILS NFR BLD: 70.4 % (ref 38–73)
NEUTROPHILS NFR FLD MANUAL: 95 %
NRBC BLD-RTO: 0 /100 WBC
PLATELET # BLD AUTO: 227 K/UL (ref 150–450)
PMV BLD AUTO: 11.4 FL (ref 9.2–12.9)
POTASSIUM SERPL-SCNC: 4.6 MMOL/L (ref 3.5–5.1)
PROT SERPL-MCNC: 7.8 G/DL (ref 6–8.4)
RBC # BLD AUTO: 4.37 M/UL (ref 4.6–6.2)
SODIUM SERPL-SCNC: 134 MMOL/L (ref 136–145)
URATE SERPL-MCNC: 6.6 MG/DL (ref 3.4–7)
WBC # BLD AUTO: 6.63 K/UL (ref 3.9–12.7)
WBC # FLD: 6908 /CU MM

## 2024-03-18 PROCEDURE — 89060 EXAM SYNOVIAL FLUID CRYSTALS: CPT | Performed by: EMERGENCY MEDICINE

## 2024-03-18 PROCEDURE — 87070 CULTURE OTHR SPECIMN AEROBIC: CPT | Performed by: EMERGENCY MEDICINE

## 2024-03-18 PROCEDURE — 84550 ASSAY OF BLOOD/URIC ACID: CPT | Performed by: EMERGENCY MEDICINE

## 2024-03-18 PROCEDURE — 85025 COMPLETE CBC W/AUTO DIFF WBC: CPT | Performed by: EMERGENCY MEDICINE

## 2024-03-18 PROCEDURE — 99284 EMERGENCY DEPT VISIT MOD MDM: CPT | Mod: 25

## 2024-03-18 PROCEDURE — 86140 C-REACTIVE PROTEIN: CPT | Performed by: EMERGENCY MEDICINE

## 2024-03-18 PROCEDURE — 80053 COMPREHEN METABOLIC PANEL: CPT | Performed by: EMERGENCY MEDICINE

## 2024-03-18 PROCEDURE — 89051 BODY FLUID CELL COUNT: CPT | Performed by: EMERGENCY MEDICINE

## 2024-03-18 PROCEDURE — 86803 HEPATITIS C AB TEST: CPT | Performed by: PHYSICIAN ASSISTANT

## 2024-03-18 PROCEDURE — 87389 HIV-1 AG W/HIV-1&-2 AB AG IA: CPT | Performed by: PHYSICIAN ASSISTANT

## 2024-03-18 PROCEDURE — 25000003 PHARM REV CODE 250: Performed by: EMERGENCY MEDICINE

## 2024-03-18 PROCEDURE — 87205 SMEAR GRAM STAIN: CPT | Performed by: EMERGENCY MEDICINE

## 2024-03-18 PROCEDURE — 85652 RBC SED RATE AUTOMATED: CPT | Performed by: EMERGENCY MEDICINE

## 2024-03-18 RX ORDER — HYDROCODONE BITARTRATE AND ACETAMINOPHEN 5; 325 MG/1; MG/1
1 TABLET ORAL
Status: COMPLETED | OUTPATIENT
Start: 2024-03-18 | End: 2024-03-18

## 2024-03-18 RX ORDER — LOSARTAN POTASSIUM 50 MG/1
50 TABLET ORAL 2 TIMES DAILY
COMMUNITY

## 2024-03-18 RX ORDER — HYDROCODONE BITARTRATE AND ACETAMINOPHEN 5; 325 MG/1; MG/1
1 TABLET ORAL EVERY 6 HOURS PRN
Qty: 12 TABLET | Refills: 0 | Status: SHIPPED | OUTPATIENT
Start: 2024-03-18

## 2024-03-18 RX ORDER — IBUPROFEN 600 MG/1
600 TABLET ORAL EVERY 6 HOURS PRN
Qty: 20 TABLET | Refills: 0 | Status: SHIPPED | OUTPATIENT
Start: 2024-03-18 | End: 2024-03-23

## 2024-03-18 RX ORDER — LIDOCAINE HYDROCHLORIDE 10 MG/ML
5 INJECTION, SOLUTION EPIDURAL; INFILTRATION; INTRACAUDAL; PERINEURAL
Status: COMPLETED | OUTPATIENT
Start: 2024-03-18 | End: 2024-03-18

## 2024-03-18 RX ADMIN — HYDROCODONE BITARTRATE AND ACETAMINOPHEN 1 TABLET: 5; 325 TABLET ORAL at 03:03

## 2024-03-18 RX ADMIN — LIDOCAINE HYDROCHLORIDE 50 MG: 10 INJECTION, SOLUTION EPIDURAL; INFILTRATION; INTRACAUDAL at 03:03

## 2024-03-18 NOTE — ED TRIAGE NOTES
Pt complains of zoie knee swelling starting three days  Pt states he had his right knee drained about 4 to 5 months ago Left knee more swollen that right knee

## 2024-03-18 NOTE — ED PROVIDER NOTES
Encounter Date: 3/18/2024       History     Chief Complaint   Patient presents with    Joint Swelling     Reports fluid in knees bilaterally     72 yo M with pmhx gout, hypertension presents with a chief complaint of left > right knee pain.  Patient has a history of recurrent knee effusions.  They undergone previous arthrocentesis and have been treated for gout.  He is also received arthrocentesis in the clinic.  Associated with swelling.  He estimates his right knee was last drained 4-5 months ago.  He notes that for the past 4-5 days, his left knee effusion has gotten quite severe and is limiting her ability to ambulate.  No fevers.  No trauma.  History is assisted by a family member.    The history is provided by the patient and a relative.     Review of patient's allergies indicates:  No Known Allergies  Past Medical History:   Diagnosis Date    Gout, unspecified     Hypertension      Past Surgical History:   Procedure Laterality Date    COLONOSCOPY N/A 10/27/2016    Procedure: COLONOSCOPY;  Surgeon: CHAPO Field MD;  Location: [facility];  Service: Endoscopy;  Laterality: N/A;    MANDIBLE SURGERY       Family History   Problem Relation Age of Onset    Hypertension Mother      Social History     Tobacco Use    Smoking status: Heavy Smoker     Current packs/day: 0.50     Types: Cigarettes    Smokeless tobacco: Never   Substance Use Topics    Alcohol use: No    Drug use: No     Review of Systems    Physical Exam     Initial Vitals [03/18/24 1457]   BP Pulse Resp Temp SpO2   (!) 167/86 76 16 99 °F (37.2 °C) 95 %      MAP       --         Physical Exam    Nursing note and vitals reviewed.  Constitutional: He appears well-developed and well-nourished. He is not diaphoretic. No distress.   HENT:   Head: Normocephalic.   Cardiovascular:  Normal rate.           Pulmonary/Chest: No stridor. No respiratory distress.   Musculoskeletal:         General: Tenderness present.      Right knee: Swelling and effusion present.  No bony tenderness. Normal range of motion. No tenderness.      Left knee: Swelling and effusion present. No bony tenderness. Decreased range of motion. Tenderness present.      Comments: No calf asymmetry     Neurological: He is alert.   Skin: Skin is warm.   Psychiatric: Thought content normal.         ED Course   Procedures  Labs Reviewed   CULTURE, BODY FLUID - BACTEC   GRAM STAIN   CULTURE, BODY FLUID - BACTEC   HIV 1 / 2 ANTIBODY   HEPATITIS C ANTIBODY   CBC W/ AUTO DIFFERENTIAL   COMPREHENSIVE METABOLIC PANEL   SEDIMENTATION RATE   C-REACTIVE PROTEIN   URIC ACID   WBC & DIFF, BODY FLUID   BODY FLUID CRYSTAL          Imaging Results              X-Ray Knee 3 View Left (In process)                      Medications   LIDOcaine (PF) 10 mg/ml (1%) injection 50 mg (50 mg Infiltration Given 3/18/24 5439)   HYDROcodone-acetaminophen 5-325 mg per tablet 1 tablet (1 tablet Oral Given 3/18/24 8318)     Medical Decision Making  72 yo M with pmhx gout, hypertension presents with a chief complaint of left > right knee pain, swelling but left knee is rigid.    My differential includes, but is not limited to:  Gout, current effusion, septic joint    Will obtain labs and inflammatory markers.  Patient informed of the should be managed as an outpatient typically but given concern for infection, will perform arthrocentesis on left today.  Ultrasound team to perform left knee arthrocentesis to evaluate for septic joint.      Reassessment:  Ultrasound team performed knee aspiration of 90 cc.  See imaging tab for details about this procedure.  At this time, my shift is coming to a close and the patient was signed out to incoming MD.      Amount and/or Complexity of Data Reviewed  Labs: ordered.  Radiology: ordered.    Risk  Prescription drug management.                                      Clinical Impression:  Final diagnoses:  [M25.562] Left knee pain  [M25.461, M25.462] Bilateral knee effusions (Primary)                  Yury Wilson MD  03/18/24 7726

## 2024-03-18 NOTE — FIRST PROVIDER EVALUATION
Medical screening examination initiated.  I have conducted a focused provider triage encounter, findings are as follows:    Brief history of present illness:  73-year-old  male with significant past medical history of gout and hypertension that presents the ED with complaint of swelling and pain in bilateral knees. Pain 9/10. No trauma or falls.     Vitals:    03/18/24 1457   BP: (!) 167/86   Pulse: 76   Resp: 16   Temp: 99 °F (37.2 °C)   TempSrc: Oral   SpO2: 95%   Weight: 86.2 kg (190 lb)       Pertinent physical exam:  generalized tenderness to bilateral knees with warmth. No medial or lateral joint line tenderness. Decreased ROM secondary to pain.     Brief workup plan:  defer to examining physician    Preliminary workup initiated; this workup will be continued and followed by the physician or advanced practice provider that is assigned to the patient when roomed.    Benji Mckee DO, FAAEM  Emergency Staff Physician   Dept of Emergency Medicine   [facility]  Spectralink: [id number]        Disclaimer: This note has been generated using voice-recognition software. There may be typographical errors that have been missed during proof-reading.

## 2024-03-18 NOTE — ED NOTES
Pt identifiers Oscar Osorio Sr.checked and are correct  LOC: The patient is awake, alert, aware of environment with an appropriate affect. Oriented x4, speaking appropriately  APPEARANCE: Pt resting comfortably, in no acute distress, pt is clean and well groomed, clothing properly fastened  SKIN: Skin warm, dry and intact, normal skin turgor, moist mucus membranes  RESPIRATORY: Airway is open and patent, respirations are spontaneous, even and unlabored, normal effort and rate  CARDIAC: Normal rate and rhythm,, capillary refill < 3 seconds, bilateral radial pulses 2+  ABDOMEN: Soft, nontender, nondistended. Bowel sounds present   NEUROLOGIC: PERRL, facial expression is symmetrical, , normal sensation in all extremities when touched with a finger.  Follows all commands appropriately  MUSCULOSKELETAL Swelling noted to both knees

## 2024-03-18 NOTE — PROGRESS NOTES
I received this patient in change over.  Patient is a 73-year-old male with history of gout, hypertension who presented with left knee pain and swelling.  Patient has a history of gout with recurrent knee effusions.  He has undergone previous arthrocentesis in the past.  Patient is on allopurinol.  No fevers or chills.  Low suspicion for septic arthritis.  Mostly concerned for gout flare.  I was asked to follow up labs and x-ray.  If labs reassuring, plan for discharge home with short course of Norco as well as NSAIDs for gout flare as well as close follow up with primary care.    Labs and imaging reviewed.  Patient's inflammatory markers and fluid studies similar to 1 year ago when the patient was diagnosed with gout.  He has evidence of crystals in his synovial fluid today.  His white blood cell count of his synovial fluid is less than 7000.  Overall, his labs seem consistent with gout flare.  X-ray shows tricompartmental osteoarthritis.  Patient was counseled on pain control for home.  He already has Voltaren gel that he can use topically.  He is to continue taking his allopurinol.  He has been prescribed a short course of Norco and can also take ibuprofen at home as needed.  He was advised to follow up with his primary care doctor within the next week.  Return precautions discussed at discharge.        XR Impression:     No acute findings in the left knee.     Tricompartmental osteoarthritis of the left knee.     Suprapatellar joint effusion.

## 2024-03-19 LAB — PATH INTERP FLD-IMP: NORMAL

## 2024-03-19 NOTE — DISCHARGE INSTRUCTIONS
You were diagnosed today with gout.  You have been prescribed a short course of Norco which is an opioid pain medicine to help with your pain at home.  Please do not take this medication while driving or operating heavy machinery as it can cause drowsiness.  You can also take ibuprofen at home for your symptoms.  You can continue to use your Voltaren gel and continue taking your allopurinol.  If you develop fever, worsening pain or any other concerns, you should return to the emergency department for re-evaluation.  Please follow-up with your primary care doctor within the next week.

## 2024-03-23 LAB — BACTERIA FLD CULT: NORMAL

## 2024-04-28 ENCOUNTER — HOSPITAL ENCOUNTER (EMERGENCY)
Facility: HOSPITAL | Age: 74
Discharge: HOME | End: 2024-04-28
Attending: EMERGENCY MEDICINE

## 2024-04-28 VITALS
BODY MASS INDEX: 26.58 KG/M2 | SYSTOLIC BLOOD PRESSURE: 182 MMHG | OXYGEN SATURATION: 98 % | WEIGHT: 180 LBS | HEART RATE: 70 BPM | RESPIRATION RATE: 18 BRPM | TEMPERATURE: 99 F | DIASTOLIC BLOOD PRESSURE: 97 MMHG

## 2024-04-28 DIAGNOSIS — M10.9 GOUT OF RIGHT KNEE, UNSPECIFIED CAUSE, UNSPECIFIED CHRONICITY: Primary | ICD-10-CM

## 2024-04-28 PROCEDURE — 99283 EMERGENCY DEPT VISIT LOW MDM: CPT

## 2024-04-28 PROCEDURE — 25000003 PHARM REV CODE 250: Performed by: EMERGENCY MEDICINE

## 2024-04-28 RX ORDER — METHYLPREDNISOLONE 4 MG/1
TABLET ORAL
Qty: 1 EACH | Refills: 0 | Status: SHIPPED | OUTPATIENT
Start: 2024-04-28 | End: 2024-05-19

## 2024-04-28 RX ORDER — ACETAMINOPHEN 500 MG
1000 TABLET ORAL
Status: COMPLETED | OUTPATIENT
Start: 2024-04-28 | End: 2024-04-28

## 2024-04-28 RX ORDER — IBUPROFEN 400 MG/1
400 TABLET ORAL
Status: COMPLETED | OUTPATIENT
Start: 2024-04-28 | End: 2024-04-28

## 2024-04-28 RX ADMIN — ACETAMINOPHEN 1000 MG: 500 TABLET ORAL at 03:04

## 2024-04-28 RX ADMIN — IBUPROFEN 400 MG: 400 TABLET ORAL at 03:04

## 2024-04-28 NOTE — ED NOTES
Patient identifiers verified and correct for Mr Osorio  C/C:  right knee pain SEE NN  APPEARANCE: awake and alert in NAD. PAIN  10/10  SKIN: warm, dry and intact. No breakdown or bruising.  MUSCULOSKELETAL: Patient moving all extremities spontaneously, no obvious swelling or deformities noted. Ambulates independently.  RESPIRATORY: Denies shortness of breath.Respirations unlabored.   CARDIAC: Denies CP, 2+ distal pulses; no peripheral edema  ABDOMEN: S/ND/NT, Denies nausea  : voids spontaneously, denies difficulty  Neurologic: AAO x 4; follows commands equal strength in all extremities; denies numbness/tingling. Denies dizziness  Deneis new weaknes, reports right knee pain

## 2024-04-28 NOTE — ED PROVIDER NOTES
Dictation #1  MRN:[id number]  CSN:[id number] Encounter Date: 4/28/2024       History     Chief Complaint   Patient presents with    Joint Swelling     Right knee. Hx of gout.      73-year-old male with a history of hypertension, gout, multiple previous ED visits with knee pain, multiple arthrocentesis procedures performed, all positive for crystals, most recently 1 month ago, presenting with right knee pain, onset yesterday, severe, associated with swelling and similar in pain quality to prior episodes of gout.  Patient reports compliance with his colchicine.  No fevers or chills and otherwise feeling well.    The history is provided by the patient.     Review of patient's allergies indicates:  No Known Allergies  Past Medical History:   Diagnosis Date    Gout, unspecified     Hypertension      Past Surgical History:   Procedure Laterality Date    COLONOSCOPY N/A 10/27/2016    Procedure: COLONOSCOPY;  Surgeon: CHAPO Field MD;  Location: [facility];  Service: Endoscopy;  Laterality: N/A;    MANDIBLE SURGERY       Family History   Problem Relation Name Age of Onset    Hypertension Mother       Social History     Tobacco Use    Smoking status: Heavy Smoker     Current packs/day: 0.50     Types: Cigarettes    Smokeless tobacco: Never   Substance Use Topics    Alcohol use: No    Drug use: No     Review of Systems    Physical Exam     Initial Vitals   BP Pulse Resp Temp SpO2   04/28/24 1437 04/28/24 1437 04/28/24 1437 04/28/24 1441 04/28/24 1437   (!) 198/102 74 12 97.1 °F (36.2 °C) 96 %      MAP       --                Physical Exam    Nursing note and vitals reviewed.  Constitutional: Vital signs are normal. He appears well-developed and well-nourished. He is not diaphoretic.  Non-toxic appearance. He does not appear ill. No distress.   HENT:   Head: Normocephalic and atraumatic.   Mouth/Throat: Mucous membranes are normal. Mucous membranes are not dry.   Eyes: Conjunctivae and lids are normal.   Neck: Neck supple.    Normal range of motion.  Cardiovascular:  Normal rate.           Musculoskeletal:      Cervical back: Normal range of motion and neck supple.      Comments: R knee:  Moderate-sized joint effusion, tenderness to palpation.  There is some increased warmth over the knee though there is no induration, no erythema.  There is only mild pain with passive ROM.     Neurological: He is alert and oriented to person, place, and time.   Skin: Skin is dry and intact. No pallor.   Psychiatric: He has a normal mood and affect. His speech is normal and behavior is normal.         ED Course   Procedures  Labs Reviewed - No data to display       Imaging Results    None          Medications   acetaminophen tablet 1,000 mg (1,000 mg Oral Given 4/28/24 1514)   ibuprofen tablet 400 mg (400 mg Oral Given 4/28/24 1514)     Medical Decision Making  Right knee effusion/arthritis, with patient clinically well-appearing, afebrile  Given multiple prior episodes of gout, well-established diagnosis, and arthrocentesis 1 month ago again confirming presence of gout, I do think it would be reasonable and safe to empirically treat this episode without performing a repeat arthrocentesis.    Patient advised to continue his colchicine and will add a course of steroids for patient.    Risk  OTC drugs.  Prescription drug management.                                      Clinical Impression:  Final diagnoses:  [M10.9] Gout of right knee, unspecified cause, unspecified chronicity (Primary)          ED Disposition Condition    Discharge Stable          ED Prescriptions       Medication Sig Dispense Start Date End Date Auth. Provider    methylPREDNISolone (MEDROL DOSEPACK) 4 mg tablet Use as directed 1 each 4/28/2024 5/19/2024 Patsy Benton MD          Follow-up Information       Follow up With Specialties Details Why Contact Info    Renita Sabillon MD Cardiology Call in 1 day  [address]  [place] [address]  [phone]      [facility] -  Emergency Dept Emergency Medicine  If symptoms worsen [address]  [facility] [address]  [phone]             Patsy Benton MD  04/28/24 8506

## 2024-11-08 ENCOUNTER — OFFICE VISIT (OUTPATIENT)
Facility: CLINIC | Age: 74
End: 2024-11-08
Payer: MEDICARE

## 2024-11-08 VITALS
SYSTOLIC BLOOD PRESSURE: 164 MMHG | HEIGHT: 69 IN | HEART RATE: 51 BPM | BODY MASS INDEX: 30.61 KG/M2 | DIASTOLIC BLOOD PRESSURE: 63 MMHG | WEIGHT: 206.69 LBS

## 2024-11-08 DIAGNOSIS — M54.9 DORSALGIA, UNSPECIFIED: ICD-10-CM

## 2024-11-08 DIAGNOSIS — G62.9 NEUROPATHY: ICD-10-CM

## 2024-11-08 DIAGNOSIS — R41.3 OTHER AMNESIA: ICD-10-CM

## 2024-11-08 DIAGNOSIS — R41.89 COGNITIVE IMPAIRMENT: Primary | ICD-10-CM

## 2024-11-08 DIAGNOSIS — R41.3 MEMORY CHANGE: ICD-10-CM

## 2024-11-08 PROCEDURE — 99999 PR PBB SHADOW E&M-EST. PATIENT-LVL V: CPT | Mod: PBBFAC,,, | Performed by: STUDENT IN AN ORGANIZED HEALTH CARE EDUCATION/TRAINING PROGRAM

## 2024-11-08 NOTE — PATIENT INSTRUCTIONS
- MRI brain wo contrast   - MRI lumbar spine   - Basic cognitive/neuropathy work up  - Ptau and neurofilament light chain   - Swallow study   - Follow up with me at the movement disorders clinic at [facility] in 1-2 months.

## 2024-11-08 NOTE — PROGRESS NOTES
[facility] NEUROLOGY  [facility], [place]    Date: 11/8/24  Patient Name: Oscar Osorio Sr.   MRN: [id number]   PCP: Renita Sabillon  Referring Provider: Self, Aaareferral    Assessment:   Oscar Osorio Sr. is a 74 y.o. male presenting for memory problems. Case most consistent with but not limited to a neuro degenerative underlying condition, possibly an atypical parkinsonian syndrome, particularly DLB. Symptoms have been going on for maybe 5 years now. MOCA today was 8 and he couldn't remember any of the 5 words on delayed recall. Besides memory problems, he has all parkinsonian features present on exam. Maybe L > R side. Although fairly symmetric. He also described bad sense of smell, constipation and urinary incontinence, orthosthasis, and possible RBD. We will start with MRI brain as well as basic cognitive/neuropathy work up (decreased sensation in his feet to different modalities). Given significant back pain and antalgic gait and recent falls, will also order an MRI of the lumbar spine. He has been having choking spells so will add a swallow study. We will plan a follow up with me at the movement disorders clinic at [facility] in 1-2 months. The patient and his wife verbalized understanding and agreed with the plan.     Plan:     - MRI brain wo contrast   - MRI lumbar spine   - Basic cognitive/neuropathy work up  - Ptau and neurofilament light chain   - Swallow study   - Follow up with me at the movement disorders clinic at [facility] in 1-2 months.     Problem List Items Addressed This Visit          Neuro    Cognitive impairment - Primary    Relevant Orders    MRI Brain Without Contrast    Fl Modified Barium Swallow Speech    TSH    Folate    pTau-181, Plasma    Neurofilament Light Chain    Vitamin B12    Vitamin B1    Treponema Pallidium Antibodies IgG, IgM    Methylmalonic Acid, Serum    Phospho-Tau 217    Homocysteine, Serum    Neuropathy       Orthopedic    Dorsalgia,  unspecified    Relevant Orders    MRI Lumbar Spine Without Contrast     Other Visit Diagnoses       Other amnesia        Relevant Orders    Protein Electrophoresis, Serum    COPPER, SERUM    Zinc    Memory change        Relevant Orders    TSH    Folate    Vitamin B12          Duane Conde MD    Subjective:   Patient seen in consultation at the request of Self, Jeff for the evaluation of memory problems. A copy of this note will be sent to the referring physician.      HPI 11/8/24:   Mr. Oscar Osorio Sr. is a 74 y.o. male presenting for memory problems. He has a history of HTN, gout, Hypercalcemia, back pain. Wife is here. They live together. He stated that he has been having problems with memory for the past 5 months. He stated that at first he would notice asking the same questions, forgetting peoples names, forgetting conversations. Symptoms have continue to worsen since then. He does not drive anymore. Its been years. Wife has noticed that sometimes it thinks like he can't think or have significant word finding like difficulty. All ADLs by himself. Sometimes he has a  when he showers. He has had hallucinations like shadows, people, dogs. Sometimes these are distressful. He has been snapping a lot more lately. He has had 3 falls in the past 6 months. He says he fell because the legs gave up. They stated that his cognition fluctuates throughout the day. They are unsure if he has a psychiatrist or has seen one. He stated that his legs feel weak and they bother him. He has also noticed problems with his balance and that he shuffles his feet when he walks.     Current Mvmt Medications:  None     Prior Mvmt Medication Trials:  None      Nonmotor ROS:  Smell/Taste: bad, for years, even before covid   Voice/Swallowing: hypophonia, dysphagia to solids and liquids   Gait/Falls: 3 falls in the past 6 months   Dizziness: yes, frequently   Hydration: 3-4 bottles daily   Urinary Issues: incontinence    Constipation: 1 every other day   Sleep/RBD: yes, maybe 1 a week   Hallucinations/Peripheral Illusions: yes  Memory/Cognition/Language: yes   Mood: anxious     PAST MEDICAL HISTORY:  Past Medical History:   Diagnosis Date    Gout, unspecified     Hypertension        PAST SURGICAL HISTORY:  Past Surgical History:   Procedure Laterality Date    COLONOSCOPY N/A 10/27/2016    Procedure: COLONOSCOPY;  Surgeon: CHAPO Field MD;  Location: [facility];  Service: Endoscopy;  Laterality: N/A;    MANDIBLE SURGERY         CURRENT MEDS:  Current Outpatient Medications   Medication Sig Dispense Refill    allopurinoL (ZYLOPRIM) 100 MG tablet Take 1 tablet (100 mg total) by mouth once daily. 14 tablet 0    aspirin 325 MG tablet Take 325 mg by mouth once daily. 2 tablets      colchicine (COLCRYS) 0.6 mg tablet Take 1 tablet (0.6 mg total) by mouth once daily. 5 tablet 0    diclofenac sodium (VOLTAREN) 1 % Gel Apply 2 g topically once daily. 50 g 0    diclofenac sodium (VOLTAREN) 1 % Gel Apply 2 g topically once daily. 100 g 0    hydrochlorothiazide (HYDRODIURIL) 25 MG tablet Take 25 mg by mouth once daily.      HYDROcodone-acetaminophen (NORCO) 5-325 mg per tablet Take 1 tablet by mouth every 6 (six) hours as needed for Pain. 12 tablet 0    lisinopril (PRINIVIL,ZESTRIL) 20 MG tablet TAKE ONE TABLET BY MOUTH EVERY DAY 30 tablet 1    losartan (COZAAR) 50 MG tablet Take 50 mg by mouth 2 (two) times a day.      pulse oximeter (PULSE OXIMETER) device Use twice daily at 8 AM and 3 PM and [facility]. 1 each 0    triamcinolone acetonide 0.1% (KENALOG) 0.1 % cream Apply topically 3 (three) times daily. for 10 days 454 g 1     No current facility-administered medications for this visit.       ALLERGIES:  Review of patient's allergies indicates:  No Known Allergies    FAMILY HISTORY:  Family History   Problem Relation Name Age of Onset    Hypertension Mother         SOCIAL HISTORY:  Social History     Tobacco  "Use    Smoking status: Heavy Smoker     Current packs/day: 0.50     Types: Cigarettes    Smokeless tobacco: Never   Substance Use Topics    Alcohol use: No    Drug use: No       Review of Systems:  12 system review of systems is negative except for the symptoms mentioned in HPI.      Objective:             Vitals:    11/08/24 1309   BP: (!) 164/63   BP Location: Left arm   Patient Position: Sitting   Pulse: (!) 51   Weight: 93.8 kg (206 lb 10.9 oz)   Height: 5' 9" (1.753 m)     General: NAD, well nourished   Eyes: no tearing, discharge, no erythema   ENT: moist mucous membranes of the oral cavity, nares patent    Neck: Supple, full range of motion  Cardiovascular: Warm and well perfused, pulses equal and symmetrical  Lungs: Normal work of breathing, normal chest wall excursions  Skin: No rash, lesions, or breakdown on exposed skin  Psychiatry: Mood and affect are appropriate   Abdomen: soft, non tender, non distended  Extremeties: No cyanosis, clubbing or edema.    Neurological   Constitutional  Well-developed, well-nourished, appears stated age   Cardiovascular  No LE edema bilaterally   Neurological     * Mental status  MOCA = 6/30     - Orientation  Oriented to conversation     - Memory   Intact recent and remote     - Attention/concentration  Attentive, vigilant during exam     - Language  Intact to conversation.     - Fund of knowledge  Aware of current events     - Executive  Well-organized thoughts     - Other     * Cranial nerves       - CN II  Pupils equal, visual fields full to confrontation     - CN III, IV, VI  Extraocular movements full, normal pursuits and saccades           - CN VII  Face strong and symmetric bilaterally     - CN VIII  Hearing intact bilaterally           - CN XI  SCM and trapezius 5/5 bilaterally         * Motor  Muscle bulk normal, strength 5/5 throughout   * Sensory   Intact to light touch throughout   * Coordination  No dysmetria with finger-to-nose    * Gait  See below.   * Deep " tendon reflexes  1+ and symmetric throughout      * Specialized movement exam Gen: no masked facies, normal blink rate    Speech: hypophonic   Tremor: at rest low frequency, low amplitude both hands. No re emergent tremor. Noticed also during posture and action but to a less degree.   Bradykinesia: generalized L > R   Tone: increased vs paratonia   Gait: antalgic + shuffling. Decreased. Resting tremor of hands comes out. Trouble turning. No freezing       I spent a total of 50 minutes on the day of the visit.   This includes face to face time and non-face to face time preparing to see the patient (eg, review of tests), obtaining and/or reviewing separately obtained history, documenting clinical information in the electronic or other health record, independently interpreting results and communicating results to the patient/family/caregiver, or care coordinator.      Duane Conde MD  Neurology

## 2024-11-18 DIAGNOSIS — E53.8 B12 DEFICIENCY: Primary | ICD-10-CM

## 2024-11-18 RX ORDER — CYANOCOBALAMIN 1000 UG/ML
INJECTION, SOLUTION INTRAMUSCULAR; SUBCUTANEOUS
Qty: 10 ML | Refills: 2 | Status: SHIPPED | OUTPATIENT
Start: 2024-11-18